# Patient Record
Sex: MALE | Race: WHITE | NOT HISPANIC OR LATINO | URBAN - METROPOLITAN AREA
[De-identification: names, ages, dates, MRNs, and addresses within clinical notes are randomized per-mention and may not be internally consistent; named-entity substitution may affect disease eponyms.]

---

## 2020-05-14 ENCOUNTER — APPOINTMENT (RX ONLY)
Dept: URBAN - METROPOLITAN AREA CLINIC 125 | Facility: CLINIC | Age: 60
Setting detail: DERMATOLOGY
End: 2020-05-14

## 2020-05-14 DIAGNOSIS — L30.1 DYSHIDROSIS [POMPHOLYX]: ICD-10-CM

## 2020-05-14 PROCEDURE — 99202 OFFICE O/P NEW SF 15 MIN: CPT

## 2020-05-14 PROCEDURE — ? COUNSELING

## 2020-05-14 PROCEDURE — ? TREATMENT REGIMEN

## 2020-05-14 PROCEDURE — ? PRESCRIPTION

## 2020-05-14 RX ORDER — MUPIROCIN 20 MG/G
1 OINTMENT TOPICAL BID
Qty: 1 | Refills: 1 | Status: ERX | COMMUNITY
Start: 2020-05-14

## 2020-05-14 RX ORDER — CLOBETASOL PROPIONATE 0.5 MG/G
1 CREAM TOPICAL BID
Qty: 1 | Refills: 2 | Status: ERX | COMMUNITY
Start: 2020-05-14

## 2020-05-14 RX ADMIN — MUPIROCIN 1: 20 OINTMENT TOPICAL at 00:00

## 2020-05-14 RX ADMIN — CLOBETASOL PROPIONATE 1: 0.5 CREAM TOPICAL at 00:00

## 2020-05-14 ASSESSMENT — LOCATION SIMPLE DESCRIPTION DERM
LOCATION SIMPLE: LEFT INDEX FINGER
LOCATION SIMPLE: RIGHT MIDDLE FINGER
LOCATION SIMPLE: RIGHT HAND
LOCATION SIMPLE: LEFT THUMB

## 2020-05-14 ASSESSMENT — LOCATION ZONE DERM
LOCATION ZONE: HAND
LOCATION ZONE: FINGER

## 2020-05-14 ASSESSMENT — LOCATION DETAILED DESCRIPTION DERM
LOCATION DETAILED: LEFT DISTAL VENTRAL THUMB
LOCATION DETAILED: LEFT INDEX FINGERTIP
LOCATION DETAILED: RIGHT RADIAL PALM
LOCATION DETAILED: RIGHT DISTAL RADIAL PALMAR MIDDLE FINGER

## 2020-05-14 NOTE — PROCEDURE: TREATMENT REGIMEN
Initiate Treatment: Clobetasol cream twice daily for 2 weeks. \\nMupirocin ointment to fissure twice daily.
Otc Regimen: Moisturize daily with a gentle moisturizer. Samples provided.
Detail Level: Zone
Plan: Will consider switching to Eucrisa once acute flare managed

## 2020-05-28 ENCOUNTER — APPOINTMENT (RX ONLY)
Dept: URBAN - METROPOLITAN AREA CLINIC 125 | Facility: CLINIC | Age: 60
Setting detail: DERMATOLOGY
End: 2020-05-28

## 2020-05-28 DIAGNOSIS — L30.1 DYSHIDROSIS [POMPHOLYX]: ICD-10-CM

## 2020-05-28 PROCEDURE — ? COUNSELING

## 2020-05-28 PROCEDURE — 99213 OFFICE O/P EST LOW 20 MIN: CPT

## 2020-05-28 PROCEDURE — ? TREATMENT REGIMEN

## 2020-05-28 PROCEDURE — ? PRESCRIPTION

## 2020-05-28 RX ORDER — CRISABOROLE 20 MG/G
1 OINTMENT TOPICAL BID
Qty: 1 | Refills: 3 | Status: CANCELLED
Stop reason: SDUPTHER

## 2020-05-28 ASSESSMENT — LOCATION DETAILED DESCRIPTION DERM
LOCATION DETAILED: RIGHT DISTAL RADIAL PALMAR MIDDLE FINGER
LOCATION DETAILED: LEFT INDEX FINGERTIP
LOCATION DETAILED: LEFT DISTAL VENTRAL THUMB
LOCATION DETAILED: RIGHT RADIAL PALM

## 2020-05-28 ASSESSMENT — LOCATION ZONE DERM
LOCATION ZONE: FINGER
LOCATION ZONE: HAND

## 2020-05-28 NOTE — PROCEDURE: TREATMENT REGIMEN
Continue Regimen: Clobetasol and Mupirocin for two more weeks
Initiate Treatment: Eucrisa ointment twice daily for maintenance
Detail Level: Zone

## 2020-07-17 ENCOUNTER — RX ONLY (OUTPATIENT)
Age: 60
Setting detail: RX ONLY
End: 2020-07-17

## 2020-07-17 RX ORDER — CRISABOROLE 20 MG/G
1 OINTMENT TOPICAL BID
Qty: 1 | Refills: 3 | Status: ERX | COMMUNITY
Start: 2020-07-17